# Patient Record
Sex: MALE | Race: WHITE | NOT HISPANIC OR LATINO | Employment: FULL TIME | URBAN - METROPOLITAN AREA
[De-identification: names, ages, dates, MRNs, and addresses within clinical notes are randomized per-mention and may not be internally consistent; named-entity substitution may affect disease eponyms.]

---

## 2017-10-19 ENCOUNTER — OFFICE VISIT (OUTPATIENT)
Dept: SURGERY | Facility: CLINIC | Age: 37
End: 2017-10-19

## 2017-10-19 VITALS
BODY MASS INDEX: 25.95 KG/M2 | OXYGEN SATURATION: 98 % | DIASTOLIC BLOOD PRESSURE: 85 MMHG | SYSTOLIC BLOOD PRESSURE: 130 MMHG | WEIGHT: 202.2 LBS | HEART RATE: 73 BPM | HEIGHT: 74 IN

## 2017-10-19 DIAGNOSIS — D49.2 SOFT TISSUE NEOPLASM: Primary | ICD-10-CM

## 2017-10-19 PROCEDURE — 99202 OFFICE O/P NEW SF 15 MIN: CPT | Performed by: SURGERY

## 2017-10-19 NOTE — PROGRESS NOTES
Subjective   Fili Bueno is a 37 y.o. male who presents to the office for a soft tissue neoplasm of the lower back.    History of Present Illness     The patient has had a subcutaneous mass of the lower back for several years.  It has been asymptomatic.  His wife believes it has gotten slightly larger.  He has shown a 2 physicians in the past and was told that it is a lipoma.  There has never been any trauma to the area.  There has never been any drainage.    Review of Systems   Constitutional: Negative for activity change, appetite change, fatigue and fever.   HENT: Negative for trouble swallowing and voice change.    Respiratory: Negative for chest tightness and shortness of breath.    Cardiovascular: Negative for chest pain and palpitations.   Gastrointestinal: Negative for abdominal pain, blood in stool, constipation, diarrhea, nausea and vomiting.   Endocrine: Negative for cold intolerance and heat intolerance.   Genitourinary: Negative for dysuria and flank pain.   Neurological: Negative for dizziness and light-headedness.   Hematological: Negative for adenopathy. Does not bruise/bleed easily.   Psychiatric/Behavioral: Negative for agitation and confusion.     Past Medical History:   Diagnosis Date   • Environmental and seasonal allergies      History reviewed. No pertinent surgical history.  Family History   Problem Relation Age of Onset   • Cancer Mother      breast     Social History     Social History   • Marital status:      Spouse name: N/A   • Number of children: N/A   • Years of education: N/A     Occupational History   • Not on file.     Social History Main Topics   • Smoking status: Never Smoker   • Smokeless tobacco: Current User     Types: Snuff   • Alcohol use Yes      Comment: SC   • Drug use: No   • Sexual activity: Not on file     Other Topics Concern   • Not on file     Social History Narrative       Objective   Physical Exam   Constitutional: He is oriented to person, place, and time.  He appears well-developed and well-nourished.  Non-toxic appearance.   Eyes: EOM are normal. No scleral icterus.   Pulmonary/Chest: Effort normal. No respiratory distress.   Abdominal: Normal appearance.   Neurological: He is alert and oriented to person, place, and time.   Skin: Skin is warm and dry.   There is a 4 cm soft tissue neoplasm of the lower back with well-defined margins that is mobile.  There is no fluctuance and no induration.   Psychiatric: He has a normal mood and affect. His behavior is normal. Judgment and thought content normal.       Assessment/Plan       The encounter diagnosis was Soft tissue neoplasm.    The patient has a soft tissue neoplasm in the lower back that is consistent with a lipoma.  He would like to avoid surgery at this time.  He will return to the office if the area gets larger or becomes symptomatic.

## 2019-05-29 NOTE — PROGRESS NOTES
Subjective   Chief Complaint   Patient presents with   • Establish Care     np-est care        History of Present Illness     37 yo wm presents to establish care and discuss seasonal allergies.   He reports that he stopped allergy shots this year and notes he has had more sinus congestion. He has been taking Zyrtec at night for this and has used his albuterol inhaler twice this winter. He had shots for 5 years from Dr. Torres. He will see him in six months.     He reports a history of anxiety around his wedding about ten years ago. Had a panic attack on the road and couldn't drive home from [place] and had to be picked up. Went to the ER and was able to settle down. Heart rate went down to 30s. Saw Dr. Hernandez  And reports all testing normal;  was told he was healthy. Took Xanax 0.25 mg daily for a month and he got better.     No clue as to when last Tdap was. He has not had HAV vaccination.     He has been using CBD oil and is sleeping much better. He sometimes has difficulty falling asleep and other has difficulty staying asleep. Taking .25 ml of CBD oil nightly.     Reports using snuff when he plays golf. He uses a can or two a week for less than a year although he notes he also used it for 4-5 years about 5 years ago.     Patient Active Problem List   Diagnosis   • Environmental and seasonal allergies   • Snuff user       Allergies   Allergen Reactions   • Codeine        Current Outpatient Medications on File Prior to Visit   Medication Sig Dispense Refill   • albuterol (PROAIR RESPICLICK) 108 (90 BASE) MCG/ACT inhaler 2 puffs 4 times a day prn 1 inhaler 0   • Cetirizine HCl (ZYRTEC PO) Take  by mouth.     • [DISCONTINUED] ALLERGY SERUM INJECTION Inject  under the skin 1 (One) Time.     • [DISCONTINUED] benzonatate (TESSALON) 200 MG capsule Take 1 capsule by mouth 3 (Three) Times a Day As Needed for cough. 30 capsule 0     No current facility-administered medications on file prior to visit.        Past Medical  "History:   Diagnosis Date   • Anxiety    • Environmental and seasonal allergies        Family History   Problem Relation Age of Onset   • Cancer Mother         breast       Social History     Socioeconomic History   • Marital status:      Spouse name: Not on file   • Number of children: Not on file   • Years of education: Not on file   • Highest education level: Not on file   Tobacco Use   • Smoking status: Never Smoker   • Smokeless tobacco: Current User     Types: Snuff   Substance and Sexual Activity   • Alcohol use: Yes     Comment: SC   • Drug use: No       Past Surgical History:   Procedure Laterality Date   • SINUS SURGERY  2014         The following portions of the patient's history were reviewed and updated as appropriate: problem list, allergies, current medications, past medical history, past family history, past social history and past surgical history.    Review of Systems   Constitution: Negative.   HENT: Positive for congestion. Negative for ear pain, hoarse voice and sore throat.    Eyes: Negative.    Cardiovascular: Negative.    Respiratory: Negative.    Gastrointestinal: Negative.    Psychiatric/Behavioral:        H/O anxiety/ panic attacks; none for the last ten years.    Allergic/Immunologic: Positive for environmental allergies.       Immunization History   Administered Date(s) Administered   • Hepatitis A 05/31/2019   • Tdap 05/31/2019       Objective   Vitals:    05/31/19 0848 05/31/19 1217   BP:  106/60   Pulse: 98    Weight: 87.5 kg (193 lb)    Height: 185.4 cm (73\")      Physical Exam   Constitutional: He is oriented to person, place, and time. He appears well-developed and well-nourished.   HENT:   Head: Normocephalic and atraumatic.   Right Ear: External ear normal.   Left Ear: External ear normal.   Nose: Mucosal edema and congestion present.   Mouth/Throat: Oropharynx is clear and moist.   +PND and cobblestoning noted.    Eyes: Conjunctivae and EOM are normal. Pupils are equal, " round, and reactive to light. No scleral icterus.   Neck: Neck supple. Carotid bruit is not present. No thyromegaly present.   Cardiovascular: Normal rate, regular rhythm, normal heart sounds and intact distal pulses. Exam reveals no gallop and no friction rub.   No murmur heard.  Pulmonary/Chest: Effort normal and breath sounds normal.   Abdominal: Soft. He exhibits no distension and no mass. There is no hepatosplenomegaly. There is no tenderness. There is no rebound.   Genitourinary: Rectum normal and prostate normal.   Musculoskeletal:   Ambulates without assistance; no clubbing, cyanosis or edema.    Lymphadenopathy:     He has no cervical adenopathy.   Neurological: He is alert and oriented to person, place, and time.   Skin: Skin is warm and dry.   Psychiatric: He has a normal mood and affect.   Vitals reviewed.      Procedures    Assessment/Plan   Fili was seen today for establish care.    Diagnoses and all orders for this visit:    Need for hepatitis A vaccination  Comments:  New probelm; HAV vaccination today and in six months.   Orders:  -     Hepatitis A Vaccine Adult IM    Need for Tdap vaccination  Comments:  New problem; Tdap today and will repeat in ten years.   Orders:  -     Tdap Vaccine Greater Than or Equal To 6yo IM    Health care maintenance  Comments:  Labs as below. He understands PSA scrrening and colonsocpy will start around age 44.   Orders:  -     Comprehensive metabolic panel  -     Lipid Panel w/ Chol/HDL Ratio    Environmental and seasonal allergies  Comments:  New problem; completed 5 yrs of allergy shots with Dr. Soriano. OTC nasal steroid as below. He understands it may take 4-6 wks to determine efficacy. Use demo'd.  Orders:  -     fluticasone (FLONASE) 50 MCG/ACT nasal spray; 2 sprays into the nostril(s) as directed by provider Daily.    Snuff user  Comments:  New problem; strongly encouraged cessation.     Will request records from previous PCP.     Discussion: He is doing well.  Exercises regularly. Strongly encouraged cessation of oral tobacco products. CMP and FLP as above and should they look good will see him annually.       Return in about 1 year (around 5/31/2020).

## 2019-05-31 ENCOUNTER — OFFICE VISIT (OUTPATIENT)
Dept: INTERNAL MEDICINE | Facility: CLINIC | Age: 39
End: 2019-05-31

## 2019-05-31 VITALS
DIASTOLIC BLOOD PRESSURE: 60 MMHG | HEART RATE: 98 BPM | HEIGHT: 73 IN | BODY MASS INDEX: 25.58 KG/M2 | WEIGHT: 193 LBS | SYSTOLIC BLOOD PRESSURE: 106 MMHG

## 2019-05-31 DIAGNOSIS — Z72.0 SNUFF USER: ICD-10-CM

## 2019-05-31 DIAGNOSIS — J30.89 ENVIRONMENTAL AND SEASONAL ALLERGIES: ICD-10-CM

## 2019-05-31 DIAGNOSIS — Z23 NEED FOR HEPATITIS A VACCINATION: Primary | ICD-10-CM

## 2019-05-31 DIAGNOSIS — Z00.00 HEALTH CARE MAINTENANCE: ICD-10-CM

## 2019-05-31 DIAGNOSIS — Z23 NEED FOR TDAP VACCINATION: ICD-10-CM

## 2019-05-31 PROCEDURE — 99203 OFFICE O/P NEW LOW 30 MIN: CPT | Performed by: NURSE PRACTITIONER

## 2019-05-31 PROCEDURE — 90715 TDAP VACCINE 7 YRS/> IM: CPT | Performed by: NURSE PRACTITIONER

## 2019-05-31 PROCEDURE — 90472 IMMUNIZATION ADMIN EACH ADD: CPT | Performed by: NURSE PRACTITIONER

## 2019-05-31 PROCEDURE — 90471 IMMUNIZATION ADMIN: CPT | Performed by: NURSE PRACTITIONER

## 2019-05-31 PROCEDURE — 90632 HEPA VACCINE ADULT IM: CPT | Performed by: NURSE PRACTITIONER

## 2019-05-31 RX ORDER — FLUTICASONE PROPIONATE 50 MCG
2 SPRAY, SUSPENSION (ML) NASAL DAILY
Qty: 1 BOTTLE | Refills: 0
Start: 2019-05-31 | End: 2021-12-03

## 2019-06-13 LAB
ALBUMIN SERPL-MCNC: 4.6 G/DL (ref 3.5–5.2)
ALBUMIN/GLOB SERPL: 2.1 G/DL
ALP SERPL-CCNC: 50 U/L (ref 39–117)
ALT SERPL-CCNC: 13 U/L (ref 1–41)
AST SERPL-CCNC: 16 U/L (ref 1–40)
BILIRUB SERPL-MCNC: 0.6 MG/DL (ref 0.2–1.2)
BUN SERPL-MCNC: 12 MG/DL (ref 6–20)
BUN/CREAT SERPL: 10 (ref 7–25)
CALCIUM SERPL-MCNC: 9.5 MG/DL (ref 8.6–10.5)
CHLORIDE SERPL-SCNC: 103 MMOL/L (ref 98–107)
CHOLEST SERPL-MCNC: 152 MG/DL (ref 0–200)
CHOLEST/HDLC SERPL: 1.88 {RATIO}
CO2 SERPL-SCNC: 30.8 MMOL/L (ref 22–29)
CREAT SERPL-MCNC: 1.2 MG/DL (ref 0.76–1.27)
GLOBULIN SER CALC-MCNC: 2.2 GM/DL
GLUCOSE SERPL-MCNC: 99 MG/DL (ref 65–99)
HDLC SERPL-MCNC: 81 MG/DL (ref 40–60)
LDLC SERPL CALC-MCNC: 65 MG/DL (ref 0–100)
POTASSIUM SERPL-SCNC: 4.7 MMOL/L (ref 3.5–5.2)
PROT SERPL-MCNC: 6.8 G/DL (ref 6–8.5)
SODIUM SERPL-SCNC: 144 MMOL/L (ref 136–145)
TRIGL SERPL-MCNC: 32 MG/DL (ref 0–150)
VLDLC SERPL CALC-MCNC: 6.4 MG/DL

## 2021-12-03 ENCOUNTER — OFFICE VISIT (OUTPATIENT)
Dept: INTERNAL MEDICINE | Facility: CLINIC | Age: 41
End: 2021-12-03

## 2021-12-03 ENCOUNTER — PATIENT ROUNDING (BHMG ONLY) (OUTPATIENT)
Dept: INTERNAL MEDICINE | Facility: CLINIC | Age: 41
End: 2021-12-03

## 2021-12-03 VITALS
BODY MASS INDEX: 25.57 KG/M2 | HEART RATE: 51 BPM | DIASTOLIC BLOOD PRESSURE: 58 MMHG | SYSTOLIC BLOOD PRESSURE: 108 MMHG | TEMPERATURE: 97.1 F | WEIGHT: 193.8 LBS | OXYGEN SATURATION: 98 %

## 2021-12-03 DIAGNOSIS — Z13.29 SCREENING FOR THYROID DISORDER: ICD-10-CM

## 2021-12-03 DIAGNOSIS — J30.89 ENVIRONMENTAL AND SEASONAL ALLERGIES: ICD-10-CM

## 2021-12-03 DIAGNOSIS — Z72.0 SNUFF USER: ICD-10-CM

## 2021-12-03 DIAGNOSIS — Z00.00 ANNUAL PHYSICAL EXAM: Primary | ICD-10-CM

## 2021-12-03 DIAGNOSIS — Z13.228 SCREENING FOR METABOLIC DISORDER: ICD-10-CM

## 2021-12-03 DIAGNOSIS — D17.1 LIPOMA OF BACK: ICD-10-CM

## 2021-12-03 DIAGNOSIS — Z13.220 SCREENING, LIPID: ICD-10-CM

## 2021-12-03 DIAGNOSIS — F41.9 ANXIETY: ICD-10-CM

## 2021-12-03 DIAGNOSIS — K21.9 GASTROESOPHAGEAL REFLUX DISEASE WITHOUT ESOPHAGITIS: ICD-10-CM

## 2021-12-03 PROBLEM — D17.39 LIPOMA OF OTHER SKIN AND SUBCUTANEOUS TISSUE: Status: ACTIVE | Noted: 2021-06-21

## 2021-12-03 PROCEDURE — 99396 PREV VISIT EST AGE 40-64: CPT | Performed by: FAMILY MEDICINE

## 2021-12-03 RX ORDER — MOMETASONE FUROATE 50 UG/1
2 SPRAY, METERED NASAL DAILY
COMMUNITY

## 2021-12-03 NOTE — PROGRESS NOTES
Date of Encounter: 2021  Patient: Fili Bueno,  1980    Subjective   History of Presenting Illness  Chief complaint: Annual physical    No acute concerns.    Environmental allergies with a history of allergy shots, sinus surgery.  He does have a history of intermittent wheezing during severe allergies but has never been diagnosed with asthma.  Allergies are currently doing well with the end of the season.  He has never tried montelukast but is currently on oral antihistamines and intranasal corticosteroids, hesitant to do montelukast due to side effects in his children.    Intermittent GERD, happens less than once a week.  Usually with typical trigger foods.    Lipoma on his lower back, not increasing in size recently, not currently painful or limiting in function.    History of anxiety associated with severe dizziness when he was driving, was on alprazolam briefly, this problem has resolved.    , 3 children.  Patient does use snuff intermittently, has used it daily for about 5 years and currently only uses it during social situations and uses nicotine lozenges otherwise, receives oral cancer screening through his dentist.  4-6 alcoholic drinks per week.  Exercises 5 days/week by walking, Trail running, and body weight exercises.  Healthy diet.  Works as an independent .  Does not have a living will.  He has not had the COVID-19 booster but is up-to-date on influenza vaccination and Tdap.    Review of Systems:  Negative for fever, congestion, chest pain upon exertion, shortness of breath, vision changes, vomiting, dysuria, lymphadenopathy, muscle weakness, numbness, mood changes, rashes.    The following portions of the patient's history were reviewed and updated as appropriate: allergies, current medications, past family history, past medical history, past social history, past surgical history and problem list.    Patient Active Problem List   Diagnosis   • Environmental and  seasonal allergies   • Snuff user   • Lipoma of back   • Anxiety   • GERD (gastroesophageal reflux disease)     Past Medical History:   Diagnosis Date   • Anxiety    • Environmental and seasonal allergies      Past Surgical History:   Procedure Laterality Date   • SINUS SURGERY  2014     Family History   Problem Relation Age of Onset   • Cancer Mother         breast       Current Outpatient Medications:   •  Cetirizine HCl (ZYRTEC PO), Take  by mouth., Disp: , Rfl:   •  MELATONIN PO, Take  by mouth., Disp: , Rfl:   •  mometasone (NASONEX) 50 MCG/ACT nasal spray, 2 sprays into the nostril(s) as directed by provider Daily., Disp: , Rfl:   •  albuterol (PROAIR RESPICLICK) 108 (90 BASE) MCG/ACT inhaler, 2 puffs 4 times a day prn, Disp: 1 inhaler, Rfl: 0  •  amoxicillin-clavulanate (AUGMENTIN) 875-125 MG per tablet, Take 1 tablet by mouth Every 12 (Twelve) Hours., Disp: 20 tablet, Rfl: 0  •  brompheniramine-pseudoephedrine-DM 30-2-10 MG/5ML syrup, Take 5 mL by mouth 4 (Four) Times a Day As Needed for Congestion or Cough., Disp: 180 mL, Rfl: 0  Allergies   Allergen Reactions   • Codeine Nausea And Vomiting     Patient says he can not take any PAIN MEDICATION. Can not remember the name.    • Hydrocodone-Acetaminophen Unknown - Low Severity     Social History     Tobacco Use   • Smoking status: Never Smoker   • Smokeless tobacco: Current User     Types: Snuff   Substance Use Topics   • Alcohol use: Yes     Comment: SC   • Drug use: No          Objective   Physical Exam  Vitals:    12/03/21 0843   BP: 108/58   Pulse: 51   Temp: 97.1 °F (36.2 °C)   TempSrc: Temporal   SpO2: 98%   Weight: 87.9 kg (193 lb 12.8 oz)     Body mass index is 25.57 kg/m².    Constitutional: NAD.  Eyes: EOMI. PERRLA. Normal conjunctiva.  Ear, nose, mouth, throat: No tonsillar exudates or erythema.   Normal nasal mucosa. Normal external ear canals and TMs bilaterally.  Cardiovascular: RRR. No murmurs. No LE edema b/l. Radial pulses 2+  bilaterally.  Pulmonary: CTA b/l. Good effort.  Integumentary: No rashes or wounds on face or upper extremities.  4 cm lipoma, mobile, just overlying and slightly to the right of the thoracolumbar spine.  It is not tender to palpation.  Lymphatic: No anterior cervical lymphadenopathy.  Endocrine: No thyromegaly or palpable thyroid nodules.  Psychiatric: Normal affect. Normal thought content.  Gastrointestinal: Nondistended. No hepatosplenomegaly. No focal tenderness to palpation. Normal bowel sounds.       Assessment/Plan   Assessment and Plan  Pleasant 41-year-old male with environmental allergies, lipoma of the back, anxiety, GERD, and chewing tobacco use, who presents with the following:    Diagnoses and all orders for this visit:    1. Annual physical exam (Primary): We discussed preventative care including age and patient-appropriate immunizations and cancer screening. We also discussed the importance of exercise and a healthy diet. This is their annual preventative exam.    2. Environmental and seasonal allergies: We discussed montelukast as an option in the future if he would like to try it especially due to his intermittent wheezing with severe allergies    3. Gastroesophageal reflux disease without esophagitis: Symptoms remain intermittent, discussed dietary measures    4. Lipoma of back: Borderline concerning size, for now we will just monitor, no other concerning characteristics    5. Anxiety: In remission    6. Snuff user: Precontemplative, receives appropriate cancer screening through dentist    7. Screening, lipid  -     Lipid Panel    8. Screening for metabolic disorder  -     Comprehensive Metabolic Panel    9. Screening for thyroid disorder  -     Thyroid Panel With TSH    Return to office in 1 year for annual physical or earlier as needed.    Perez Martinez MD  Family Medicine  O: [phone]  C: [phone]    Disclaimer: Parts of this note were dictated by speech recognition. Minor errors in  transcription may be present. Please call if questions.

## 2021-12-03 NOTE — PROGRESS NOTES
December 3, 2021    Hello, may I speak with Fili Bueno?    My name is MIRANDA     I am  with [facility]  [address] [place] [address] [phone].    Before we get started may I verify your date of birth? 1980    I am calling to officially welcome you to our practice and ask about your recent visit. Is this a good time to talk? yes    Tell me about your visit with us. What things went well?  NO COMPLAINTS       We're always looking for ways to make our patients' experiences even better. Do you have recommendations on ways we may improve?  no    Overall were you satisfied with your first visit to our practice? yes       I appreciate you taking the time to speak with me today. Is there anything else I can do for you? no      Thank you, and have a great day.

## 2021-12-04 LAB
ALBUMIN SERPL-MCNC: 4.8 G/DL (ref 4–5)
ALBUMIN/GLOB SERPL: 2 {RATIO} (ref 1.2–2.2)
ALP SERPL-CCNC: 56 IU/L (ref 44–121)
ALT SERPL-CCNC: 14 IU/L (ref 0–44)
AST SERPL-CCNC: 18 IU/L (ref 0–40)
BILIRUB SERPL-MCNC: 0.6 MG/DL (ref 0–1.2)
BUN SERPL-MCNC: 16 MG/DL (ref 6–24)
BUN/CREAT SERPL: 12 (ref 9–20)
CALCIUM SERPL-MCNC: 9.7 MG/DL (ref 8.7–10.2)
CHLORIDE SERPL-SCNC: 100 MMOL/L (ref 96–106)
CHOLEST SERPL-MCNC: 171 MG/DL (ref 100–199)
CO2 SERPL-SCNC: 26 MMOL/L (ref 20–29)
CREAT SERPL-MCNC: 1.29 MG/DL (ref 0.76–1.27)
FT4I SERPL CALC-MCNC: 1.4 (ref 1.2–4.9)
GLOBULIN SER CALC-MCNC: 2.4 G/DL (ref 1.5–4.5)
GLUCOSE SERPL-MCNC: 87 MG/DL (ref 65–99)
HDLC SERPL-MCNC: 81 MG/DL
LDLC SERPL CALC-MCNC: 83 MG/DL (ref 0–99)
POTASSIUM SERPL-SCNC: 4.7 MMOL/L (ref 3.5–5.2)
PROT SERPL-MCNC: 7.2 G/DL (ref 6–8.5)
SODIUM SERPL-SCNC: 140 MMOL/L (ref 134–144)
T3RU NFR SERPL: 27 % (ref 24–39)
T4 SERPL-MCNC: 5.3 UG/DL (ref 4.5–12)
TRIGL SERPL-MCNC: 32 MG/DL (ref 0–149)
TSH SERPL DL<=0.005 MIU/L-ACNC: 1.28 UIU/ML (ref 0.45–4.5)
VLDLC SERPL CALC-MCNC: 7 MG/DL (ref 5–40)

## 2021-12-16 ENCOUNTER — TELEPHONE (OUTPATIENT)
Dept: INTERNAL MEDICINE | Facility: CLINIC | Age: 41
End: 2021-12-16

## 2021-12-16 NOTE — TELEPHONE ENCOUNTER
----- Message from Perez Martinez MD sent at 12/15/2021  9:29 PM EST -----  Patient has not viewed their [employer] results message. Please call or send a letter to the patient as appropriate with the information in my results note.

## 2022-05-17 ENCOUNTER — OFFICE VISIT (OUTPATIENT)
Dept: INTERNAL MEDICINE | Facility: CLINIC | Age: 42
End: 2022-05-17

## 2022-05-17 VITALS
TEMPERATURE: 99.3 F | HEART RATE: 81 BPM | DIASTOLIC BLOOD PRESSURE: 84 MMHG | HEIGHT: 73 IN | SYSTOLIC BLOOD PRESSURE: 129 MMHG | WEIGHT: 194 LBS | OXYGEN SATURATION: 97 % | BODY MASS INDEX: 25.71 KG/M2

## 2022-05-17 DIAGNOSIS — J06.9 URTI (ACUTE UPPER RESPIRATORY INFECTION): ICD-10-CM

## 2022-05-17 PROCEDURE — 99213 OFFICE O/P EST LOW 20 MIN: CPT | Performed by: NURSE PRACTITIONER

## 2022-05-17 RX ORDER — ALBUTEROL SULFATE 90 UG/1
1 AEROSOL, METERED RESPIRATORY (INHALATION) EVERY 4 HOURS PRN
Qty: 8 G | Refills: 3 | Status: SHIPPED | OUTPATIENT
Start: 2022-05-17

## 2022-05-17 RX ORDER — AZITHROMYCIN 250 MG/1
TABLET, FILM COATED ORAL
Qty: 6 TABLET | Refills: 0 | Status: SHIPPED | OUTPATIENT
Start: 2022-05-17 | End: 2023-03-24

## 2022-05-17 NOTE — PROGRESS NOTES
Date of Encounter: 2022  Patient: Fili Bueno,  1980    Subjective     Chief complaint: Cough, congestion    HPI   Patient here today for sick visit.  Patient states that he has been spending a lot of time outside and thought that his allergies were acting up but his symptoms have gotten worse over the last few days.  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  Patient states that he has had a lot of mucus that he is coughing up since that time.  Patient is having trouble sleeping and also has mild ear pain.    Patient is in need of a refill on his albuterol.  Patient states that he took some Zyrtec.  Patient took 2 COVID tests at home which were both negative.  Denies any fever.    Review of Systems:  Negative for fever, chest pain upon exertion, shortness of breath, vision changes, vomiting, dysuria, lymphadenopathy, muscle weakness, numbness, mood changes, rashes.  Positive for cough, congestion, ear discomfort.    The following portions of the patient's history were reviewed and updated as appropriate: allergies, current medications, past family history, past medical history, past social history, past surgical history and problem list.    Patient Active Problem List   Diagnosis   • Environmental and seasonal allergies   • Snuff user   • Lipoma of back   • Anxiety   • GERD (gastroesophageal reflux disease)     Past Medical History:   Diagnosis Date   • Anxiety    • Environmental and seasonal allergies      Past Surgical History:   Procedure Laterality Date   • SINUS SURGERY       Family History   Problem Relation Age of Onset   • Cancer Mother         breast       Current Outpatient Medications:   •  Cetirizine HCl (ZYRTEC PO), Take  by mouth., Disp: , Rfl:   •  MELATONIN PO, Take  by mouth., Disp: , Rfl:   •  mometasone (NASONEX) 50 MCG/ACT nasal spray, 2 sprays into the nostril(s) as directed by provider Daily., Disp: , Rfl:   •  albuterol sulfate  (90  "Base) MCG/ACT inhaler, Inhale 1 puff Every 4 (Four) Hours As Needed for Wheezing or Shortness of Air., Disp: 8 g, Rfl: 3  •  azithromycin (Zithromax Z-Caleb) 250 MG tablet, Take 2 tablets by mouth on day 1, then 1 tablet daily on days 2-5, Disp: 6 tablet, Rfl: 0  Allergies   Allergen Reactions   • Codeine Nausea And Vomiting     Patient says he can not take any PAIN MEDICATION. Can not remember the name.    • Hydrocodone-Acetaminophen Unknown - Low Severity     Social History     Tobacco Use   • Smoking status: Never Smoker   • Smokeless tobacco: Current User     Types: Snuff   Substance Use Topics   • Alcohol use: Yes     Comment: SC   • Drug use: No          Objective   Physical Exam   Vitals:    05/17/22 0853   BP: 129/84   Pulse: 81   Temp: 99.3 °F (37.4 °C)   TempSrc: Temporal   SpO2: 97%   Weight: 88 kg (194 lb)   Height: 185.4 cm (73\")     Body mass index is 25.6 kg/m².    Constitutional: NAD.  Eyes:Normal conjunctiva.  Ear, nose, mouth, throat: No tonsillar exudates positive erythema.   Normal nasal mucosa. Normal external ear canals and right TM slightly red.  Cardiovascular: RRR. No murmurs.   Pulmonary: CTA b/l. Good effort.  Positive for productive cough.  Integumentary: No rashes or wounds on face or upper extremities.         Assessment & Plan   Assessment and Plan  Pleasant 41-year-old male with anxiety, lipoma of back, GERD and others here today for sick visit.  The following was discussed:    Diagnoses and all orders for this visit:    1. URTI (acute upper respiratory infection)  -     albuterol sulfate  (90 Base) MCG/ACT inhaler; Inhale 1 puff Every 4 (Four) Hours As Needed for Wheezing or Shortness of Air.  Dispense: 8 g; Refill: 3  -     azithromycin (Zithromax Z-Caleb) 250 MG tablet; Take 2 tablets by mouth on day 1, then 1 tablet daily on days 2-5  Dispense: 6 tablet; Refill: 0         Discussed about medications with patient.  Encourage patient to wait on taking the antibiotic for a couple " more days if symptoms worsen.  In the meantime patient is to use Mucinex over-the-counter as well as cetirizine and Nasonex daily.  Answered some questions from the patient.    Return if symptoms worsen or fail to improve.     Zenia Liriano DNP, FNP-C  [facility]  O: [phone]      Please note that portions of this note were completed with a voice recognition program. Please call if questions.

## 2023-03-24 ENCOUNTER — OFFICE VISIT (OUTPATIENT)
Dept: INTERNAL MEDICINE | Facility: CLINIC | Age: 43
End: 2023-03-24
Payer: COMMERCIAL

## 2023-03-24 VITALS
WEIGHT: 196.8 LBS | SYSTOLIC BLOOD PRESSURE: 114 MMHG | HEIGHT: 73 IN | DIASTOLIC BLOOD PRESSURE: 62 MMHG | BODY MASS INDEX: 26.08 KG/M2 | HEART RATE: 53 BPM | TEMPERATURE: 97.3 F

## 2023-03-24 DIAGNOSIS — R19.7 INTERMITTENT DIARRHEA: Primary | ICD-10-CM

## 2023-03-24 DIAGNOSIS — Z12.11 ENCOUNTER FOR SCREENING FOR COLORECTAL MALIGNANT NEOPLASM: ICD-10-CM

## 2023-03-24 DIAGNOSIS — Z12.12 ENCOUNTER FOR SCREENING FOR COLORECTAL MALIGNANT NEOPLASM: ICD-10-CM

## 2023-03-24 DIAGNOSIS — R10.9 ABDOMINAL CRAMPING: ICD-10-CM

## 2023-03-24 PROCEDURE — 99214 OFFICE O/P EST MOD 30 MIN: CPT | Performed by: FAMILY MEDICINE

## 2023-03-24 RX ORDER — DICYCLOMINE HYDROCHLORIDE 10 MG/1
CAPSULE ORAL
Qty: 30 CAPSULE | Refills: 1 | Status: SHIPPED | OUTPATIENT
Start: 2023-03-24

## 2023-03-24 NOTE — PROGRESS NOTES
Date of Encounter: 2023  Patient: Fili Bueno,  1980    Subjective   History of Presenting Illness  Chief complaint: diarrhea    Patient has had several years of intermittent diarrhea with lower abdominal cramping.  Tends to be episodic.  Describes usually 3 to 4 days of watery, nonbloody diarrhea, with lower abdominal cramping, often postprandially.  He has not been able to identify any inciting intake triggers or other associated symptoms.  This diarrhea can be so bad that he is unable to go out in public.  He does not have any rash, joint pain, or family history of IBD.  This does not appear to be worsening but it is also not improving.  The cramping is usually in his lower abdomen in both quadrants, not focal, and he can notice it when he is bending over for long periods of time.  Occasionally he takes align probiotics, unsure if this has been helpful.  Sometimes uses Pepto-Bismol tabs as well.  No heartburn with this.  He does not eat excess lactose or drink excess alcohol.    The following portions of the patient's history were reviewed and updated as appropriate: allergies, current medications, past family history, past medical history, past social history, past surgical history and problem list.    Patient Active Problem List   Diagnosis   • Environmental and seasonal allergies   • Snuff user   • Lipoma of back   • Anxiety   • GERD (gastroesophageal reflux disease)     Past Medical History:   Diagnosis Date   • Anxiety    • Environmental and seasonal allergies      Past Surgical History:   Procedure Laterality Date   • SINUS SURGERY       Family History   Problem Relation Age of Onset   • Cancer Mother         breast       Current Outpatient Medications:   •  albuterol sulfate  (90 Base) MCG/ACT inhaler, Inhale 1 puff Every 4 (Four) Hours As Needed for Wheezing or Shortness of Air., Disp: 8 g, Rfl: 3  •  Cetirizine HCl (ZYRTEC PO), Take  by mouth., Disp: , Rfl:   •  MELATONIN PO, Take  " by mouth., Disp: , Rfl:   •  mometasone (NASONEX) 50 MCG/ACT nasal spray, 2 sprays into the nostril(s) as directed by provider Daily., Disp: , Rfl:   •  dicyclomine (BENTYL) 10 MG capsule, Take 1 to 4 times daily as needed for IBS, Disp: 30 capsule, Rfl: 1  Allergies   Allergen Reactions   • Codeine Nausea And Vomiting     Patient says he can not take any PAIN MEDICATION. Can not remember the name.    • Hydrocodone-Acetaminophen Unknown - Low Severity     Social History     Tobacco Use   • Smoking status: Never   • Smokeless tobacco: Current     Types: Snuff   Substance Use Topics   • Alcohol use: Yes     Comment: SC   • Drug use: No          Objective   Physical Exam  Vitals:    03/24/23 0812   BP: 114/62   Pulse: 53   Temp: 97.3 °F (36.3 °C)   TempSrc: Infrared   Weight: 89.3 kg (196 lb 12.8 oz)   Height: 185.4 cm (73\")     Body mass index is 25.96 kg/m².    Constitutional: NAD.  Psychiatric: Normal affect. Normal thought content.  Gastrointestinal: Nondistended. No hepatosplenomegaly. No focal tenderness to palpation. Normal bowel sounds.       Assessment & Plan   Assessment and Plan  Pleasant 42-year-old male with environmental allergies, lipoma of the back, anxiety, GERD, and chewing tobacco use, who presents with the following:    Diagnoses and all orders for this visit:    1. Intermittent diarrhea (Primary)  -     dicyclomine (BENTYL) 10 MG capsule; Take 1 to 4 times daily as needed for IBS  Dispense: 30 capsule; Refill: 1  2. Abdominal cramping  -     dicyclomine (BENTYL) 10 MG capsule; Take 1 to 4 times daily as needed for IBS  Dispense: 30 capsule; Refill: 1  3. Encounter for screening for colorectal malignant neoplasm  -     Ambulatory Referral For Screening Colonoscopy    Symptoms sound like IBS-D, possibly celiac but seems to be a little too intermittent.  I recommend a trial of dicyclomine as needed during episodes, Imodium when going out in public.  He does not have any red flag symptoms but " regardless, I do want him to get an early screening colonoscopy.  If his symptoms are not improving over the next few weeks to months I would arrange abdominal imaging due to the cramping.  I discussed reasons to return for further evaluation.    Perez Martinez MD  Family Medicine  O: [phone]    Disclaimer: Parts of this note were dictated by speech recognition. Minor errors in transcription may be present. Please call if questions.

## 2023-05-19 ENCOUNTER — OFFICE VISIT (OUTPATIENT)
Dept: INTERNAL MEDICINE | Facility: CLINIC | Age: 43
End: 2023-05-19
Payer: COMMERCIAL

## 2023-05-19 VITALS
DIASTOLIC BLOOD PRESSURE: 88 MMHG | HEIGHT: 73 IN | HEART RATE: 61 BPM | WEIGHT: 196.87 LBS | TEMPERATURE: 97.5 F | SYSTOLIC BLOOD PRESSURE: 122 MMHG | OXYGEN SATURATION: 99 % | BODY MASS INDEX: 26.09 KG/M2

## 2023-05-19 DIAGNOSIS — J30.89 ENVIRONMENTAL AND SEASONAL ALLERGIES: ICD-10-CM

## 2023-05-19 DIAGNOSIS — R05.9 COUGH, UNSPECIFIED TYPE: Primary | ICD-10-CM

## 2023-05-19 DIAGNOSIS — J02.9 SORE THROAT: ICD-10-CM

## 2023-05-19 DIAGNOSIS — J06.9 URTI (ACUTE UPPER RESPIRATORY INFECTION): ICD-10-CM

## 2023-05-19 PROBLEM — M25.569 KNEE PAIN: Status: ACTIVE | Noted: 2020-03-10

## 2023-05-19 LAB
EXPIRATION DATE: NORMAL
EXPIRATION DATE: NORMAL
FLUAV AG UPPER RESP QL IA.RAPID: NOT DETECTED
FLUBV AG UPPER RESP QL IA.RAPID: NOT DETECTED
INTERNAL CONTROL: NORMAL
INTERNAL CONTROL: NORMAL
Lab: NORMAL
Lab: NORMAL
S PYO AG THROAT QL: NEGATIVE
SARS-COV-2 AG UPPER RESP QL IA.RAPID: NOT DETECTED

## 2023-05-19 PROCEDURE — 87880 STREP A ASSAY W/OPTIC: CPT | Performed by: NURSE PRACTITIONER

## 2023-05-19 PROCEDURE — 87428 SARSCOV & INF VIR A&B AG IA: CPT | Performed by: NURSE PRACTITIONER

## 2023-05-19 PROCEDURE — 99213 OFFICE O/P EST LOW 20 MIN: CPT | Performed by: NURSE PRACTITIONER

## 2023-05-19 RX ORDER — AZITHROMYCIN 250 MG/1
TABLET, FILM COATED ORAL
Qty: 6 TABLET | Refills: 0 | Status: SHIPPED | OUTPATIENT
Start: 2023-05-19

## 2023-05-19 NOTE — PROGRESS NOTES
"Chief Complaint  Cough, Sore Throat (Swolling lymph), URI, and Fatigue    Subjective        Fili Bueno presents to [facility]  History of Present Illness  Here with complaints of cough, sore throat, swollen lymph nodes, and fatigue  He was last seen in our office for same a forementioned symptoms on May 17, 2022 diagnosed with acute upper respiratory infection.  Prescribed an albuterol inhaler and a Z-Caleb. He felt much better with this combination.     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 Started to feel fatigued, deep chest congestion with cough, some phlegm production, thick and green. Was feeling better by Thursday, has been taking OTC cough medication/expectorant and Advil. He takes Zyrtec and Nasonex every night for years. Has had allergy shots in the past, and had surgery for deviated septum correction. Sore throat started about Tuesday, now swollen lymph nodes. He was told he has asthma as a child. Has an albuterol inhaler, PRN. Last time used was yesterday, did help. He was wheezing a little about 2 days ago.     No chest pain, shortness of breath, extreme fatigue, fever.           Objective   Vital Signs:  /88 (BP Location: Left arm, Patient Position: Sitting, Cuff Size: Adult)   Pulse 61   Temp 97.5 °F (36.4 °C) (Infrared)   Ht 185.4 cm (72.99\")   Wt 89.3 kg (196 lb 13.9 oz)   SpO2 99%   BMI 25.98 kg/m²   Estimated body mass index is 25.98 kg/m² as calculated from the following:    Height as of this encounter: 185.4 cm (72.99\").    Weight as of this encounter: 89.3 kg (196 lb 13.9 oz).             Physical Exam  Vitals and nursing note reviewed.   Constitutional:       Appearance: Normal appearance. He is normal weight.   HENT:      Head: Normocephalic and atraumatic.      Right Ear: Hearing, tympanic membrane, ear canal and external ear normal. No drainage, swelling or tenderness. No middle ear effusion. No foreign body. " Tympanic membrane is not scarred, perforated, erythematous or retracted.      Left Ear: Hearing, tympanic membrane, ear canal and external ear normal. No drainage, swelling or tenderness.  No middle ear effusion. No foreign body. Tympanic membrane is not scarred, perforated, erythematous or retracted.      Nose: No nasal deformity, septal deviation, signs of injury, nasal tenderness or mucosal edema.      Right Nostril: No foreign body, epistaxis, septal hematoma or occlusion.      Left Nostril: No foreign body, epistaxis, septal hematoma or occlusion.      Right Turbinates: Not enlarged, swollen or pale.      Left Turbinates: Not enlarged, swollen or pale.      Right Sinus: No maxillary sinus tenderness or frontal sinus tenderness.      Left Sinus: No maxillary sinus tenderness or frontal sinus tenderness.      Mouth/Throat:      Lips: Pink. No lesions.      Mouth: Mucous membranes are moist.      Dentition: Normal dentition.      Tongue: No lesions. Tongue does not deviate from midline.      Palate: No mass and lesions.      Pharynx: Uvula midline. No pharyngeal swelling or uvula swelling.      Tonsils: No tonsillar exudate or tonsillar abscesses. 0 on the right. 0 on the left.     Eyes:      General: Lids are normal.      Conjunctiva/sclera: Conjunctivae normal.   Cardiovascular:      Rate and Rhythm: Normal rate and regular rhythm.      Pulses: Normal pulses.   Pulmonary:      Effort: Pulmonary effort is normal.      Breath sounds: Normal air entry. No decreased air movement or transmitted upper airway sounds. No decreased breath sounds.   Neurological:      Mental Status: He is alert.        Result Review :                   Assessment and Plan   Patient is a pleasant 42-year-old male with environmental and seasonal allergies, GERD, anxiety, tobacco user, specifically snuff with upper respiratory symptoms today.    Results for orders placed or performed in visit on 05/19/23   POC Rapid Strep A    Specimen: Swab    Result Value Ref Range    Rapid Strep A Screen Negative Negative, VALID, INVALID, Not Performed    Internal Control Passed Passed    Lot Number [id number]     Expiration Date 12/14/2025    POCT SARS-CoV-2 Antigen GINNY    Specimen: Swab   Result Value Ref Range    SARS Antigen Not Detected Not Detected, Presumptive Negative    Influenza A Antigen GINNY Not Detected Not Detected    Influenza B Antigen GINNY Not Detected Not Detected    Internal Control Passed Passed    Lot Number [id number]     Expiration Date 03/23/2024          Diagnoses and all orders for this visit:    1. Cough, unspecified type (Primary)  Comments:  Continue to use inhaler as needed.  If increased use inhaler, 3 more times per day, follow-up with Dr. Martinez for further evaluation.  Orders:  -     POCT SARS-CoV-2 Antigen GINNY    2. Sore throat  -     POC Rapid Strep A    3. URTI (acute upper respiratory infection)  -     azithromycin (Zithromax Z-Caleb) 250 MG tablet; Take 2 tablets by mouth on day 1, then 1 tablet daily on days 2-5  Dispense: 6 tablet; Refill: 0    4. Environmental and seasonal allergies    Continue current regimen, which includes nightly Nasonex and Zyrtec.         Follow Up   Return if symptoms worsen or fail to improve.  If you have increased work with breathing, chest pain, extreme fatigue, call 911 and proceed to the emergency department.  Patient was given instructions and counseling regarding his condition or for health maintenance advice. Please see specific information pulled into the AVS if appropriate.

## 2023-12-11 ENCOUNTER — OFFICE VISIT (OUTPATIENT)
Dept: INTERNAL MEDICINE | Facility: CLINIC | Age: 43
End: 2023-12-11
Payer: COMMERCIAL

## 2023-12-11 VITALS
BODY MASS INDEX: 25.92 KG/M2 | HEART RATE: 50 BPM | WEIGHT: 195.6 LBS | DIASTOLIC BLOOD PRESSURE: 82 MMHG | OXYGEN SATURATION: 98 % | TEMPERATURE: 97.3 F | SYSTOLIC BLOOD PRESSURE: 122 MMHG | HEIGHT: 73 IN

## 2023-12-11 DIAGNOSIS — J02.8 ACUTE PHARYNGITIS DUE TO OTHER SPECIFIED ORGANISMS: Primary | ICD-10-CM

## 2023-12-11 PROCEDURE — 99213 OFFICE O/P EST LOW 20 MIN: CPT | Performed by: NURSE PRACTITIONER

## 2023-12-11 RX ORDER — DICYCLOMINE HYDROCHLORIDE 10 MG/1
10 CAPSULE ORAL
COMMUNITY
Start: 2023-11-16

## 2023-12-11 RX ORDER — AMOXICILLIN 500 MG/1
500 TABLET, FILM COATED ORAL 2 TIMES DAILY
Qty: 20 TABLET | Refills: 0 | Status: SHIPPED | OUTPATIENT
Start: 2023-12-11 | End: 2023-12-21

## 2023-12-11 NOTE — PROGRESS NOTES
"Chief Complaint  swollen throat and Jaw Pain    Subjective        Fili Bueno presents to [facility]  History of Present Illness  Here with complaints of lymph nodes right side of neck swelling x 2 or more weeks.     He was last seen in our office on May 19, 2023 with complaints of cough, sore throat, swollen lymph nodes, and fatigue.  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  He also mention, at that particular visit, that he took Zyrtec and Nasonex every night for the many years.  Has an inhaler, and was recommended to increase use of the inhaler until cough was under control.  He was also prescribed a Z-Caleb and told to continue nightly regimen of Nasonex and Zyrtec.    He has an URI about 1 month ago, improved with OTC medication. For about 2 weeks, having swelling and pain in the right side of neck. No SOA, chest pain. No near pain or difficulty hearing.       Objective   Vital Signs:  /82 (BP Location: Left arm, Patient Position: Sitting, Cuff Size: Adult)   Pulse 50   Temp 97.3 °F (36.3 °C) (Infrared)   Ht 185.4 cm (72.99\")   Wt 88.7 kg (195 lb 9.6 oz)   SpO2 98%   BMI 25.81 kg/m²   Estimated body mass index is 25.81 kg/m² as calculated from the following:    Height as of this encounter: 185.4 cm (72.99\").    Weight as of this encounter: 88.7 kg (195 lb 9.6 oz).       BMI is >= 25 and <30. (Overweight) The following options were offered after discussion;: not discussed at today's office visit.       Physical Exam  Vitals reviewed.   Constitutional:       General: He is not in acute distress.     Appearance: Normal appearance. He is normal weight. He is not ill-appearing, toxic-appearing or diaphoretic.   HENT:      Right Ear: Tympanic membrane, ear canal and external ear normal.      Left Ear: Tympanic membrane, ear canal and external ear normal.      Nose: Nose " normal.      Mouth/Throat:      Mouth: Mucous membranes are moist.      Pharynx: Oropharynx is clear. Posterior oropharyngeal erythema (Mild) present. No oropharyngeal exudate.   Eyes:      General:         Right eye: No discharge.         Left eye: No discharge.      Extraocular Movements: Extraocular movements intact.      Conjunctiva/sclera: Conjunctivae normal.      Pupils: Pupils are equal, round, and reactive to light.   Cardiovascular:      Rate and Rhythm: Regular rhythm. Bradycardia present.      Pulses: Normal pulses.      Heart sounds: Normal heart sounds.   Pulmonary:      Effort: Pulmonary effort is normal.      Breath sounds: Normal breath sounds.   Musculoskeletal:      Cervical back: Normal range of motion.   Lymphadenopathy:      Head:      Right side of head: No submental, submandibular, tonsillar, preauricular, posterior auricular or occipital adenopathy.      Left side of head: No submental, submandibular, tonsillar, preauricular, posterior auricular or occipital adenopathy.      Cervical: Cervical adenopathy present.      Right cervical: Superficial cervical adenopathy (mildly enlarged, mobile, tender, about 0.5 cm) present.      Left cervical: Superficial cervical adenopathy (midly enlarged, mobile, tender, about 0.5 cm) present.   Skin:     General: Skin is warm and dry.   Neurological:      General: No focal deficit present.      Mental Status: He is alert and oriented to person, place, and time. Mental status is at baseline.   Psychiatric:         Mood and Affect: Mood normal.         Behavior: Behavior normal.         Thought Content: Thought content normal.         Judgment: Judgment normal.        Result Review :                   Assessment and Plan   Patient is a very pleasant 43-year-old male with environmental and seasonal allergies, GERD, history lipoma of back, anxiety, current snuff user here with unspecified infection of the neck with bilateral mildly swollen and tender lymph  nodes.      Diagnoses and all orders for this visit:    1. Acute pharyngitis due to other specified organisms (Primary)  -     amoxicillin (AMOXIL) 500 MG tablet; Take 1 tablet by mouth 2 (Two) Times a Day for 10 days.  Dispense: 20 tablet; Refill: 0             Follow Up   Return for Follow up with Dr. Martinez as needed..  Patient was given instructions and counseling regarding his condition or for health maintenance advice. Please see specific information pulled into the AVS if appropriate.

## 2024-01-26 ENCOUNTER — OFFICE VISIT (OUTPATIENT)
Dept: INTERNAL MEDICINE | Facility: CLINIC | Age: 44
End: 2024-01-26
Payer: COMMERCIAL

## 2024-01-26 VITALS
OXYGEN SATURATION: 97 % | HEIGHT: 73 IN | BODY MASS INDEX: 25.55 KG/M2 | SYSTOLIC BLOOD PRESSURE: 108 MMHG | WEIGHT: 192.8 LBS | TEMPERATURE: 97.5 F | DIASTOLIC BLOOD PRESSURE: 74 MMHG | HEART RATE: 67 BPM

## 2024-01-26 DIAGNOSIS — K58.0 IRRITABLE BOWEL SYNDROME WITH DIARRHEA: ICD-10-CM

## 2024-01-26 DIAGNOSIS — J45.20 MILD INTERMITTENT REACTIVE AIRWAY DISEASE WITHOUT COMPLICATION: ICD-10-CM

## 2024-01-26 DIAGNOSIS — D17.1 LIPOMA OF BACK: ICD-10-CM

## 2024-01-26 DIAGNOSIS — K21.9 GASTROESOPHAGEAL REFLUX DISEASE WITHOUT ESOPHAGITIS: ICD-10-CM

## 2024-01-26 DIAGNOSIS — F41.9 ANXIETY: ICD-10-CM

## 2024-01-26 DIAGNOSIS — Z13.9 SCREENING FOR UNSPECIFIED CONDITION: ICD-10-CM

## 2024-01-26 DIAGNOSIS — Z98.890 H/O MEDIAL MENISCUS REPAIR OF LEFT KNEE: ICD-10-CM

## 2024-01-26 DIAGNOSIS — Z00.00 ANNUAL PHYSICAL EXAM: Primary | ICD-10-CM

## 2024-01-26 DIAGNOSIS — J30.89 ENVIRONMENTAL AND SEASONAL ALLERGIES: ICD-10-CM

## 2024-01-26 DIAGNOSIS — Z12.11 ENCOUNTER FOR SCREENING FOR COLORECTAL MALIGNANT NEOPLASM: ICD-10-CM

## 2024-01-26 DIAGNOSIS — Z12.12 ENCOUNTER FOR SCREENING FOR COLORECTAL MALIGNANT NEOPLASM: ICD-10-CM

## 2024-01-26 DIAGNOSIS — Z12.5 SCREENING FOR MALIGNANT NEOPLASM OF PROSTATE: ICD-10-CM

## 2024-01-26 PROBLEM — J45.909 REACTIVE AIRWAY DISEASE: Status: ACTIVE | Noted: 2024-01-26

## 2024-01-26 NOTE — PROGRESS NOTES
Date of Encounter: 2024  Patient: Fili Bueno,  1980    Subjective   History of Presenting Illness  Chief complaint: Annual physical     No acute concerns.    GERD, diet controlled, no recent flareups.    Presumed IBS-D, manages symptoms with dicyclomine well, no red flag symptoms such as blood in stool or severe pain.  He was unable to get his colonoscopy due to job change.  Willing to get it this year essentially on schedule at 44.    History of left medial meniscus repair, no major issues and uses a brace when Trail running.    History of anxiety, having some increased symptoms recently with job change, this is associated with some dizziness as it was previously.  He was on alprazolam but really does not want to start anything right now.  He has tried cannabis but felt that it made things worse.  His alcohol use has remained mild.  Son has some anxiety, his mother did as well     Environmental allergies with history of allergy shots, sinus surgery.  He does have reactive airway disease and can be induced to wheeze especially when ill.  He does not have any asthma exacerbations and is not using his albuterol inhaler regularly.  His daughter is asthmatic.    Lipoma of the right lower back, saw Dr. Garcia for this in 2017 at 4 cm.  It is not causing him discomfort but has increased in size to about 4.5 cm.      , 3 children, 10, 12, 13 years old.  Patient does use nicotine pouches intermittently, no longer using chewing tobacco, mostly uses during social situations.  Gets oral cancer screening through dentist.  4 alcoholic drinks per week or less. Exercises at [employer] 3 times weekly with cardio and weights, also trail runs occasionally.  Healthy diet. Recent job change, now working sales in finance. Does not have a living will.  Discussed vaccinations which he declines.    The following portions of the patient's history were reviewed and updated as appropriate: allergies, current medications,  "past family history, past medical history, past social history, past surgical history and problem list.    Patient Active Problem List   Diagnosis    Environmental and seasonal allergies    Nicotine pouch use    Lipoma of back    Anxiety    GERD (gastroesophageal reflux disease)    H/O medial meniscus repair of left knee    Reactive airway disease    Irritable bowel syndrome with diarrhea     Past Medical History:   Diagnosis Date    Anxiety     Environmental and seasonal allergies      Past Surgical History:   Procedure Laterality Date    SINUS SURGERY  2014     Family History   Problem Relation Age of Onset    Cancer Mother         breast       Current Outpatient Medications:     albuterol sulfate  (90 Base) MCG/ACT inhaler, Inhale 1 puff Every 4 (Four) Hours As Needed for Wheezing or Shortness of Air., Disp: 8 g, Rfl: 3    Cetirizine HCl (ZYRTEC PO), Take  by mouth., Disp: , Rfl:     dicyclomine (BENTYL) 10 MG capsule, Take 1 capsule by mouth 4 (Four) Times a Day Before Meals & at Bedtime., Disp: , Rfl:     mometasone (NASONEX) 50 MCG/ACT nasal spray, 2 sprays into the nostril(s) as directed by provider Daily., Disp: , Rfl:   Allergies   Allergen Reactions    Codeine Nausea And Vomiting     Patient says he can not take any PAIN MEDICATION. Can not remember the name.     Hydrocodone-Acetaminophen Unknown - Low Severity     Social History     Tobacco Use    Smoking status: Never    Smokeless tobacco: Current     Types: Snuff   Vaping Use    Vaping Use: Never used   Substance Use Topics    Alcohol use: Yes     Comment: SC    Drug use: No          Objective   Physical Exam  Vitals:    01/26/24 1403   BP: 108/74   BP Location: Left arm   Patient Position: Sitting   Cuff Size: Large Adult   Pulse: 67   Temp: 97.5 °F (36.4 °C)   TempSrc: Infrared   SpO2: 97%   Weight: 87.5 kg (192 lb 12.8 oz)   Height: 185.4 cm (72.99\")     Body mass index is 25.44 kg/m².    Constitutional: NAD.  Eyes: EOMI. PERRLA. Normal " conjunctiva.  Ear, nose, mouth, throat: No tonsillar exudates or erythema.   Normal nasal mucosa. Normal external ear canals and TMs bilaterally.  Cardiovascular: RRR. No murmurs. No LE edema b/l. Radial pulses 2+ bilaterally.  Pulmonary: CTA b/l. Good effort.  With deep coughing I can force and end expiratory wheeze, mild in bilateral.  Integumentary: No rashes or wounds on face or upper extremities.  1 cm lipoma just to the right of the midline lower back.  About 4.5 cm in greatest diameter.  Soft, fairly mobile but possibly fluctuant.  No overlying skin changes.  Lymphatic: No anterior cervical lymphadenopathy.  Endocrine: No thyromegaly or palpable thyroid nodules.  Psychiatric: Normal affect. Normal thought content.  Gastrointestinal: Nondistended. No hepatosplenomegaly. No focal tenderness to palpation. Normal bowel sounds.     Assessment & Plan   Assessment and Plan  Very pleasant 43-year-old male with GERD, IBS-D, history of left medial meniscus repair, anxiety, reactive airway disease, allergies, lipoma, who presents for the following:    Diagnoses and all orders for this visit:    1. Annual physical exam (Primary): We discussed preventative care including age and patient-appropriate immunizations and cancer screening. We also discussed the importance of exercise and a healthy diet. This is their annual preventative exam.    2. Gastroesophageal reflux disease without esophagitis: Diet controlled    3. Irritable bowel syndrome with diarrhea: Improving with dicyclomine, will arrange colonoscopy    4. H/O medial meniscus repair of left knee: Stable with no major symptoms    5. Anxiety: Worsening, discussed options including short course of alprazolam which he benefited from previously.  He would like to wait and consider this for now but he can message us if he changes his mind and we can submit for short to medium term use.  I did discuss SSRIs briefly.    6. Mild intermittent reactive airway disease without  complication: He can be provoked to wheeze but does not have symptoms at baseline unless he is ill.  He has albuterol at home.  Recommend continued observation, treat as asthmatic when ill.    7. Environmental and seasonal allergies: Symptoms are stable without the need for further immunotherapy    8. Lipoma of back: Slight increase in size to 4.5 cm.  Very superficial may be amenable to outpatient removal.  Because of size increase and location I want him to talk to Dr. Garcia again.  -     Ambulatory Referral to General Surgery    9. Screening for unspecified condition  -     Hemoglobin A1c  -     CBC & Differential  -     Comprehensive Metabolic Panel  -     Lipid Panel  -     Thyroid Panel With TSH  -     Urinalysis With Microscopic - Urine, Clean Catch    10. Screening for malignant neoplasm of prostate  -     PSA Screen    11. Encounter for screening for colorectal malignant neoplasm  -     Ambulatory Referral For Screening Colonoscopy    Return to office in 1 year for annual physical or earlier as needed.    Perez Martinez MD  Family Medicine  O: [phone]    Disclaimer: Parts of this note were dictated by speech recognition. Minor errors in transcription may be present. Please call if questions.

## 2024-01-27 LAB
ALBUMIN SERPL-MCNC: 5 G/DL (ref 4.1–5.1)
ALBUMIN/GLOB SERPL: 2.8 {RATIO} (ref 1.2–2.2)
ALP SERPL-CCNC: 58 IU/L (ref 44–121)
ALT SERPL-CCNC: 13 IU/L (ref 0–44)
APPEARANCE UR: CLEAR
AST SERPL-CCNC: 18 IU/L (ref 0–40)
BACTERIA #/AREA URNS HPF: NORMAL /[HPF]
BASOPHILS # BLD AUTO: 0.1 X10E3/UL (ref 0–0.2)
BASOPHILS NFR BLD AUTO: 1 %
BILIRUB SERPL-MCNC: 0.4 MG/DL (ref 0–1.2)
BILIRUB UR QL STRIP: NEGATIVE
BUN SERPL-MCNC: 17 MG/DL (ref 6–24)
BUN/CREAT SERPL: 13 (ref 9–20)
CALCIUM SERPL-MCNC: 9.6 MG/DL (ref 8.7–10.2)
CASTS URNS QL MICRO: NORMAL /LPF
CHLORIDE SERPL-SCNC: 102 MMOL/L (ref 96–106)
CHOLEST SERPL-MCNC: 157 MG/DL (ref 100–199)
CO2 SERPL-SCNC: 26 MMOL/L (ref 20–29)
COLOR UR: YELLOW
CREAT SERPL-MCNC: 1.32 MG/DL (ref 0.76–1.27)
EGFRCR SERPLBLD CKD-EPI 2021: 68 ML/MIN/1.73
EOSINOPHIL # BLD AUTO: 0.1 X10E3/UL (ref 0–0.4)
EOSINOPHIL NFR BLD AUTO: 1 %
EPI CELLS #/AREA URNS HPF: NORMAL /HPF (ref 0–10)
ERYTHROCYTE [DISTWIDTH] IN BLOOD BY AUTOMATED COUNT: 12.4 % (ref 11.6–15.4)
FT4I SERPL CALC-MCNC: 1.7 (ref 1.2–4.9)
GLOBULIN SER CALC-MCNC: 1.8 G/DL (ref 1.5–4.5)
GLUCOSE SERPL-MCNC: 94 MG/DL (ref 70–99)
GLUCOSE UR QL STRIP: NEGATIVE
HBA1C MFR BLD: 5.6 % (ref 4.8–5.6)
HCT VFR BLD AUTO: 42.3 % (ref 37.5–51)
HDLC SERPL-MCNC: 77 MG/DL
HGB BLD-MCNC: 14.1 G/DL (ref 13–17.7)
HGB UR QL STRIP: NEGATIVE
IMM GRANULOCYTES # BLD AUTO: 0 X10E3/UL (ref 0–0.1)
IMM GRANULOCYTES NFR BLD AUTO: 0 %
KETONES UR QL STRIP: NEGATIVE
LDLC SERPL CALC-MCNC: 64 MG/DL (ref 0–99)
LEUKOCYTE ESTERASE UR QL STRIP: NEGATIVE
LYMPHOCYTES # BLD AUTO: 1.8 X10E3/UL (ref 0.7–3.1)
LYMPHOCYTES NFR BLD AUTO: 27 %
MCH RBC QN AUTO: 29.8 PG (ref 26.6–33)
MCHC RBC AUTO-ENTMCNC: 33.3 G/DL (ref 31.5–35.7)
MCV RBC AUTO: 89 FL (ref 79–97)
MICRO URNS: NORMAL
MICRO URNS: NORMAL
MONOCYTES # BLD AUTO: 0.6 X10E3/UL (ref 0.1–0.9)
MONOCYTES NFR BLD AUTO: 8 %
NEUTROPHILS # BLD AUTO: 4.1 X10E3/UL (ref 1.4–7)
NEUTROPHILS NFR BLD AUTO: 63 %
NITRITE UR QL STRIP: NEGATIVE
PH UR STRIP: 7.5 [PH] (ref 5–7.5)
PLATELET # BLD AUTO: 322 X10E3/UL (ref 150–450)
POTASSIUM SERPL-SCNC: 4.6 MMOL/L (ref 3.5–5.2)
PROT SERPL-MCNC: 6.8 G/DL (ref 6–8.5)
PROT UR QL STRIP: NEGATIVE
PSA SERPL-MCNC: 0.7 NG/ML (ref 0–4)
RBC # BLD AUTO: 4.73 X10E6/UL (ref 4.14–5.8)
RBC #/AREA URNS HPF: NORMAL /HPF (ref 0–2)
SODIUM SERPL-SCNC: 141 MMOL/L (ref 134–144)
SP GR UR STRIP: 1.01 (ref 1–1.03)
T3RU NFR SERPL: 30 % (ref 24–39)
T4 SERPL-MCNC: 5.5 UG/DL (ref 4.5–12)
TRIGL SERPL-MCNC: 87 MG/DL (ref 0–149)
TSH SERPL DL<=0.005 MIU/L-ACNC: 1.02 UIU/ML (ref 0.45–4.5)
UROBILINOGEN UR STRIP-MCNC: 0.2 MG/DL (ref 0.2–1)
VLDLC SERPL CALC-MCNC: 16 MG/DL (ref 5–40)
WBC # BLD AUTO: 6.6 X10E3/UL (ref 3.4–10.8)
WBC #/AREA URNS HPF: NORMAL /HPF (ref 0–5)

## 2024-01-30 NOTE — PROGRESS NOTES
Bloodwork looks great    Do you take a creatine supplement? Your creatinine is mildly elevated - not clinically significant, and if you water intake was down this is also explanatory.

## 2024-02-13 ENCOUNTER — OFFICE VISIT (OUTPATIENT)
Dept: SURGERY | Facility: CLINIC | Age: 44
End: 2024-02-13
Payer: COMMERCIAL

## 2024-02-13 VITALS
WEIGHT: 195 LBS | SYSTOLIC BLOOD PRESSURE: 118 MMHG | HEIGHT: 73 IN | BODY MASS INDEX: 25.84 KG/M2 | DIASTOLIC BLOOD PRESSURE: 80 MMHG

## 2024-02-13 DIAGNOSIS — D17.1 LIPOMA OF TORSO: Primary | ICD-10-CM

## 2024-02-13 PROCEDURE — 99202 OFFICE O/P NEW SF 15 MIN: CPT | Performed by: SURGERY

## 2024-03-25 DIAGNOSIS — K58.0 IRRITABLE BOWEL SYNDROME WITH DIARRHEA: Primary | ICD-10-CM

## 2024-03-26 RX ORDER — DICYCLOMINE HYDROCHLORIDE 10 MG/1
CAPSULE ORAL
Qty: 180 CAPSULE | Refills: 3 | Status: SHIPPED | OUTPATIENT
Start: 2024-03-26

## 2024-03-26 NOTE — TELEPHONE ENCOUNTER
Rx Refill Note  Requested Prescriptions     Pending Prescriptions Disp Refills    dicyclomine (BENTYL) 10 MG capsule       Sig: Take 1 capsule by mouth 4 (Four) Times a Day Before Meals & at Bedtime.      Last office visit with prescribing clinician: 1/26/2024   Last telemedicine visit with prescribing clinician: Visit date not found   Next office visit with prescribing clinician: Visit date not found                         Would you like a call back once the refill request has been completed: [] Yes [] No    If the office needs to give you a call back, can they leave a voicemail: [] Yes [] No    Albertina Monroy MA  03/26/24, 09:32 EDT

## 2024-03-27 DIAGNOSIS — K58.0 IRRITABLE BOWEL SYNDROME WITH DIARRHEA: ICD-10-CM

## 2024-03-27 RX ORDER — DICYCLOMINE HYDROCHLORIDE 10 MG/1
CAPSULE ORAL
Qty: 180 CAPSULE | Refills: 3 | OUTPATIENT
Start: 2024-03-27

## 2024-09-18 ENCOUNTER — OFFICE VISIT (OUTPATIENT)
Dept: INTERNAL MEDICINE | Facility: CLINIC | Age: 44
End: 2024-09-18
Payer: COMMERCIAL

## 2024-09-18 VITALS
SYSTOLIC BLOOD PRESSURE: 126 MMHG | WEIGHT: 195 LBS | OXYGEN SATURATION: 98 % | BODY MASS INDEX: 25.84 KG/M2 | DIASTOLIC BLOOD PRESSURE: 80 MMHG | HEART RATE: 53 BPM | TEMPERATURE: 98 F | HEIGHT: 73 IN

## 2024-09-18 DIAGNOSIS — R59.0 INGUINAL ADENOPATHY: ICD-10-CM

## 2024-09-18 DIAGNOSIS — R53.83 OTHER FATIGUE: Primary | ICD-10-CM

## 2024-09-18 DIAGNOSIS — R19.09 GROIN LUMP: ICD-10-CM

## 2024-09-18 PROCEDURE — 99214 OFFICE O/P EST MOD 30 MIN: CPT | Performed by: NURSE PRACTITIONER

## 2024-09-24 LAB
25(OH)D3+25(OH)D2 SERPL-MCNC: 42.4 NG/ML (ref 30–100)
ALBUMIN SERPL-MCNC: 4.7 G/DL (ref 4.1–5.1)
ALP SERPL-CCNC: 60 IU/L (ref 44–121)
ALT SERPL-CCNC: 13 IU/L (ref 0–44)
APPEARANCE UR: CLEAR
AST SERPL-CCNC: 16 IU/L (ref 0–40)
BACTERIA #/AREA URNS HPF: NORMAL /[HPF]
BASOPHILS # BLD AUTO: 0 X10E3/UL (ref 0–0.2)
BASOPHILS NFR BLD AUTO: 1 %
BILIRUB SERPL-MCNC: 0.3 MG/DL (ref 0–1.2)
BILIRUB UR QL STRIP: NEGATIVE
BUN SERPL-MCNC: 11 MG/DL (ref 6–24)
BUN/CREAT SERPL: 9 (ref 9–20)
CALCIUM SERPL-MCNC: 9.4 MG/DL (ref 8.7–10.2)
CASTS URNS QL MICRO: NORMAL /LPF
CHLORIDE SERPL-SCNC: 103 MMOL/L (ref 96–106)
CO2 SERPL-SCNC: 25 MMOL/L (ref 20–29)
COLOR UR: YELLOW
CREAT SERPL-MCNC: 1.19 MG/DL (ref 0.76–1.27)
CRP SERPL-MCNC: 10 MG/L (ref 0–10)
EGFRCR SERPLBLD CKD-EPI 2021: 77 ML/MIN/1.73
EOSINOPHIL # BLD AUTO: 0 X10E3/UL (ref 0–0.4)
EOSINOPHIL NFR BLD AUTO: 0 %
EPI CELLS #/AREA URNS HPF: NORMAL /HPF (ref 0–10)
ERYTHROCYTE [DISTWIDTH] IN BLOOD BY AUTOMATED COUNT: 12.3 % (ref 11.6–15.4)
ERYTHROCYTE [SEDIMENTATION RATE] IN BLOOD BY WESTERGREN METHOD: 2 MM/HR (ref 0–15)
FT4I SERPL CALC-MCNC: 1.8 (ref 1.2–4.9)
GLOBULIN SER CALC-MCNC: 2.1 G/DL (ref 1.5–4.5)
GLUCOSE SERPL-MCNC: 91 MG/DL (ref 70–99)
GLUCOSE UR QL STRIP: NEGATIVE
HCT VFR BLD AUTO: 43.9 % (ref 37.5–51)
HGB BLD-MCNC: 14.3 G/DL (ref 13–17.7)
HGB UR QL STRIP: NEGATIVE
IMM GRANULOCYTES # BLD AUTO: 0 X10E3/UL (ref 0–0.1)
IMM GRANULOCYTES NFR BLD AUTO: 0 %
KETONES UR QL STRIP: NEGATIVE
LEUKOCYTE ESTERASE UR QL STRIP: NEGATIVE
LYMPHOCYTES # BLD AUTO: 1.1 X10E3/UL (ref 0.7–3.1)
LYMPHOCYTES NFR BLD AUTO: 19 %
MCH RBC QN AUTO: 30.3 PG (ref 26.6–33)
MCHC RBC AUTO-ENTMCNC: 32.6 G/DL (ref 31.5–35.7)
MCV RBC AUTO: 93 FL (ref 79–97)
MICRO URNS: NORMAL
MICRO URNS: NORMAL
MONOCYTES # BLD AUTO: 0.8 X10E3/UL (ref 0.1–0.9)
MONOCYTES NFR BLD AUTO: 14 %
NEUTROPHILS # BLD AUTO: 3.8 X10E3/UL (ref 1.4–7)
NEUTROPHILS NFR BLD AUTO: 66 %
NITRITE UR QL STRIP: NEGATIVE
PH UR STRIP: 7 [PH] (ref 5–7.5)
PLATELET # BLD AUTO: 288 X10E3/UL (ref 150–450)
POTASSIUM SERPL-SCNC: 4.8 MMOL/L (ref 3.5–5.2)
PROT SERPL-MCNC: 6.8 G/DL (ref 6–8.5)
PROT UR QL STRIP: NEGATIVE
RBC # BLD AUTO: 4.72 X10E6/UL (ref 4.14–5.8)
RBC #/AREA URNS HPF: NORMAL /HPF (ref 0–2)
SODIUM SERPL-SCNC: 142 MMOL/L (ref 134–144)
SP GR UR STRIP: 1 (ref 1–1.03)
T3RU NFR SERPL: 31 % (ref 24–39)
T4 SERPL-MCNC: 5.8 UG/DL (ref 4.5–12)
TESTOST FREE SERPL-MCNC: 10.8 PG/ML (ref 6.8–21.5)
TESTOST SERPL-MCNC: 403.7 NG/DL (ref 264–916)
TSH SERPL DL<=0.005 MIU/L-ACNC: 1.53 UIU/ML (ref 0.45–4.5)
UROBILINOGEN UR STRIP-MCNC: 0.2 MG/DL (ref 0.2–1)
VIT B12 SERPL-MCNC: 280 PG/ML (ref 232–1245)
WBC # BLD AUTO: 5.7 X10E3/UL (ref 3.4–10.8)
WBC #/AREA URNS HPF: NORMAL /HPF (ref 0–5)